# Patient Record
Sex: FEMALE | Race: WHITE
[De-identification: names, ages, dates, MRNs, and addresses within clinical notes are randomized per-mention and may not be internally consistent; named-entity substitution may affect disease eponyms.]

---

## 2021-10-04 ENCOUNTER — HOSPITAL ENCOUNTER (EMERGENCY)
Dept: HOSPITAL 63 - ER | Age: 66
Discharge: HOME | End: 2021-10-04
Payer: MEDICARE

## 2021-10-04 VITALS — DIASTOLIC BLOOD PRESSURE: 74 MMHG | SYSTOLIC BLOOD PRESSURE: 127 MMHG

## 2021-10-04 VITALS — WEIGHT: 293 LBS | BODY MASS INDEX: 45.99 KG/M2 | HEIGHT: 67 IN

## 2021-10-04 DIAGNOSIS — Z91.011: ICD-10-CM
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
## 2021-10-04 DIAGNOSIS — S01.112A: Primary | ICD-10-CM

## 2021-10-04 DIAGNOSIS — Z88.8: ICD-10-CM

## 2021-10-04 DIAGNOSIS — S40.021A: ICD-10-CM

## 2021-10-04 LAB
ANION GAP SERPL CALC-SCNC: 0 MMOL/L (ref 6–14)
APTT PPP: YELLOW S
BACTERIA #/AREA URNS HPF: 0 /HPF
BASOPHILS # BLD AUTO: 0.1 X10^3/UL (ref 0–0.2)
BASOPHILS NFR BLD: 1 % (ref 0–3)
BILIRUB UR QL STRIP: (no result)
CA-I SERPL ISE-MCNC: 18 MG/DL (ref 7–20)
CALCIUM SERPL-MCNC: 8.9 MG/DL (ref 8.5–10.1)
CHLORIDE SERPL-SCNC: 101 MMOL/L (ref 98–107)
CO2 SERPL-SCNC: 44 MMOL/L (ref 21–32)
CREAT SERPL-MCNC: 0.8 MG/DL (ref 0.6–1)
EOSINOPHIL NFR BLD: 0.4 X10^3/UL (ref 0–0.7)
EOSINOPHIL NFR BLD: 5 % (ref 0–3)
ERYTHROCYTE [DISTWIDTH] IN BLOOD BY AUTOMATED COUNT: 13.3 % (ref 11.5–14.5)
FIBRINOGEN PPP-MCNC: CLEAR MG/DL
GFR SERPLBLD BASED ON 1.73 SQ M-ARVRAT: 71.5 ML/MIN
GLUCOSE SERPL-MCNC: 108 MG/DL (ref 70–99)
GLUCOSE UR STRIP-MCNC: (no result) MG/DL
HCT VFR BLD CALC: 34.4 % (ref 36–47)
HGB BLD-MCNC: 10.8 G/DL (ref 12–15.5)
LYMPHOCYTES # BLD: 1.5 X10^3/UL (ref 1–4.8)
LYMPHOCYTES NFR BLD AUTO: 19 % (ref 24–48)
MCH RBC QN AUTO: 30 PG (ref 25–35)
MCHC RBC AUTO-ENTMCNC: 31 G/DL (ref 31–37)
MCV RBC AUTO: 95 FL (ref 79–100)
MONO #: 0.9 X10^3/UL (ref 0–1.1)
MONOCYTES NFR BLD: 12 % (ref 0–9)
NEUT #: 4.9 X10^3UL (ref 1.8–7.7)
NEUTROPHILS NFR BLD AUTO: 63 % (ref 31–73)
NITRITE UR QL STRIP: (no result)
PLATELET # BLD AUTO: 244 X10^3/UL (ref 140–400)
POTASSIUM SERPL-SCNC: 3.6 MMOL/L (ref 3.5–5.1)
RBC # BLD AUTO: 3.64 X10^6/UL (ref 3.5–5.4)
RBC #/AREA URNS HPF: 0 /HPF (ref 0–2)
SODIUM SERPL-SCNC: 145 MMOL/L (ref 136–145)
SP GR UR STRIP: 1.02
SQUAMOUS #/AREA URNS LPF: (no result) /LPF
UROBILINOGEN UR-MCNC: 0.2 MG/DL
WBC # BLD AUTO: 7.9 X10^3/UL (ref 4–11)
WBC #/AREA URNS HPF: 0 /HPF (ref 0–4)

## 2021-10-04 PROCEDURE — 81001 URINALYSIS AUTO W/SCOPE: CPT

## 2021-10-04 PROCEDURE — 12013 RPR F/E/E/N/L/M 2.6-5.0 CM: CPT

## 2021-10-04 PROCEDURE — 80048 BASIC METABOLIC PNL TOTAL CA: CPT

## 2021-10-04 PROCEDURE — 93005 ELECTROCARDIOGRAM TRACING: CPT

## 2021-10-04 PROCEDURE — 70450 CT HEAD/BRAIN W/O DYE: CPT

## 2021-10-04 PROCEDURE — 85025 COMPLETE CBC W/AUTO DIFF WBC: CPT

## 2021-10-04 PROCEDURE — 36415 COLL VENOUS BLD VENIPUNCTURE: CPT

## 2021-10-04 NOTE — RAD
CT head without contrast:



Reason for examination: Fell today with dizziness.



Helical images were obtained through the brain with no contrast administered.



Exposure: One or more of the following individualized dose reduction techniques were utilized for thi
s examination:  1. Automated exposure control  2. Adjustment of the mA and/or kV according to patient
 size  3. Use of iterative reconstruction technique.



Ventricular systems are symmetric and not dilated. No midline shift is seen. There is no evidence of 
intracranial hemorrhage, infarct, mass or edema. These are seen at the orbits. The paranasal sinuses 
and mastoid air cells are clear. No acute abnormality seen in the skull.



IMPRESSION:



No acute intracranial abnormality.



Electronically signed by: Tracy Manning MD (10/4/2021 3:27 PM) [facility]

## 2021-10-04 NOTE — PHYS DOC
Past History


Past Surgical History:  Other


 (KADE MAGALLANES)





General Adult


EDM:


Chief Complaint:  MECHANICAL FALL





HPI:


HPI:





Patient is a 66 year old female who presents from nursing home via EMS with head

laceration and right arm contusion.  Patient states that she was going to sit in

her wheelchair, when she is got dizzy suddenly.  She states she missed the chair

and extended her arm to catch her fall.  She reports she did hit her head on the

linoleum floor, where she sustained her laceration.  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 

Dawson," and has fallen about 3 times a month for the past 6 months.  Patient 

denies headache, loss of consciousness, blurred vision, double vision, or loss 

of vision.  Patient reports she is incontinent.  Patient has no other complaints

at this time.


 (KADE MAGALLANES)





Review of Systems:


Review of Systems:


Constitutional:  Denies fever or chills 


Eyes:  See HPI 


HENT:  Denies nasal congestion or sore throat 


Respiratory:  Denies cough or shortness of breath 


Cardiovascular:  Denies chest pain or edema 


GI:  Denies abdominal pain, nausea, vomiting, bloody stools or diarrhea 


: Denies dysuria or hematuria 


Musculoskeletal:  Denies back pain or joint pain 


Integument:  See HPI


Neurologic:  Denies headache, focal weakness or sensory changes 


 (KADE MAGALLANES)





Allergies:


Allergies:





Allergies








Coded Allergies Type Severity Reaction Last Updated Verified


 


  Milk Containing Products Allergy Unknown  10/4/21 Yes


 


  meperidine Allergy Unknown  10/4/21 Yes








 (KADE MAGALLANES)





Physical Exam:


PE:





Constitutional: Patient resting comfortably in bed with oxygen via nasal 

cannula.  Well developed, well nourished, no acute distress, non-toxic 

appearance. 


HENT: Normocephalic, laceration as noted below, bilateral external ears normal, 

oropharynx moist, no oral exudates, missing dentition without infection, nose 

normal. 


Eyes: PERRLA, EOMI, conjunctiva normal, no discharge. 


Neck: Normal range of motion, no step-offs, no bony tenderness, supple, no st

ridor. 


Cardiovascular: Heart rate regular rhythm, no murmur.


Lungs & Thorax:  Bilateral breath sounds clear to auscultation.


Abdomen: Bowel sounds normal, soft, no tenderness, no masses, no pulsatile 

masses.


Skin: 4 cm laceration noted to left supraorbital ridge in the eyebrow.  

Otherwise skin is warm, dry, no erythema, no rash.  


Back: No step-offs, no bony tenderness, no CVA tenderness. 


Extremities: Minimal tenderness to a dorsal side distal right upper extremity 

contusion with hematoma.  Extremities otherwise no tenderness, no cyanosis, no 

clubbing, ROM intact, no edema.  Neurovascular intact x4.


Neurologic: Alert and oriented x3, normal motor function, normal sensory 

function, no focal deficits noted.


 (KADE MAGALLANES)





Current Patient Data:


Labs:





Laboratory Tests








Test


 10/4/21


15:46 10/4/21


17:12


 


White Blood Count


 7.9 x10^3/uL


(4.0-11.0) 





 


Red Blood Count


 3.64 x10^6/uL


(3.50-5.40) 





 


Hemoglobin


 10.8 g/dL


(12.0-15.5) 





 


Hematocrit


 34.4 %


(36.0-47.0) 





 


Mean Corpuscular Volume 95 fL ()  


 


Mean Corpuscular Hemoglobin 30 pg (25-35)  


 


Mean Corpuscular Hemoglobin


Concent 31 g/dL


(31-37) 





 


Red Cell Distribution Width


 13.3 %


(11.5-14.5) 





 


Platelet Count


 244 x10^3/uL


(140-400) 





 


Neutrophils (%) (Auto) 63 % (31-73)  


 


Lymphocytes (%) (Auto) 19 % (24-48)  


 


Monocytes (%) (Auto) 12 % (0-9)  


 


Eosinophils (%) (Auto) 5 % (0-3)  


 


Basophils (%) (Auto) 1 % (0-3)  


 


Neutrophils # (Auto)


 4.9 x10^3uL


(1.8-7.7) 





 


Lymphocytes # (Auto)


 1.5 x10^3/uL


(1.0-4.8) 





 


Monocytes # (Auto)


 0.9 x10^3/uL


(0.0-1.1) 





 


Eosinophils # (Auto)


 0.4 x10^3/uL


(0.0-0.7) 





 


Basophils # (Auto)


 0.1 x10^3/uL


(0.0-0.2) 





 


Sodium Level


 145 mmol/L


(136-145) 





 


Potassium Level


 3.6 mmol/L


(3.5-5.1) 





 


Chloride Level


 101 mmol/L


() 





 


Carbon Dioxide Level


 44 mmol/L


(21-32) 





 


Anion Gap 0 (6-14)  


 


Blood Urea Nitrogen


 18 mg/dL


(7-20) 





 


Creatinine


 0.8 mg/dL


(0.6-1.0) 





 


Estimated GFR


(Cockcroft-Gault) 71.5 


 





 


Glucose Level


 108 mg/dL


(70-99) 





 


Calcium Level


 8.9 mg/dL


(8.5-10.1) 





 


Urine Collection Type  Unknown 


 


Urine Color  Yellow 


 


Urine Clarity  Clear 


 


Urine pH  7.5 


 


Urine Specific Gravity  1.025 


 


Urine Protein


 


 Neg


(NEG-TRACE)


 


Urine Glucose (UA)


 


 Neg mg/dL


(NEG)


 


Urine Ketones (Stick)


 


 Neg mg/dL


(NEG)


 


Urine Blood  Neg (NEG) 


 


Urine Nitrite  Neg (NEG) 


 


Urine Bilirubin  Neg (NEG) 


 


Urine Urobilinogen Dipstick


 


 0.2 mg/dL (0.2


mg/dL)


 


Urine Leukocyte Esterase  Neg (NEG) 


 


Urine RBC  0 /HPF (0-2) 


 


Urine WBC  0 /HPF (0-4) 


 


Urine Squamous Epithelial


Cells 


 None /LPF 





 


Urine Bacteria  0 /HPF (0-FEW) 








Vital Signs:





                                   Vital Signs








  Date Time  Temp Pulse Resp B/P (MAP) Pulse Ox O2 Delivery O2 Flow Rate FiO2


 


10/4/21 15:00 97.2 79 16 126/52 (76) 97  4.0 








 (KADE MAGALLANES)





EKG:


EKG:


EKG Interpreted by Dr. Condon: Regular rate 77bpm and rhythm with no ectopic 

beats.  No concerning ST-T wave changes.  Regular QR interval. 


 (KADE MAGALLANES)





Radiology/Procedures:


Radiology/Procedures:


PROCEDURE: CT HEAD WO CONTRAST





CT head without contrast:





Reason for examination: Fell today with dizziness.





Helical images were obtained through the brain with no contrast administered.





Exposure: One or more of the following individualized dose reduction techniques 

were utilized for this examination:  1. Automated exposure control  2. 

Adjustment of the mA and/or kV according to patient size  3. Use of iterative 

reconstruction technique.





Ventricular systems are symmetric and not dilated. No midline shift is seen. 

There is no evidence of intracranial hemorrhage, infarct, mass or edema. These 

are seen at the orbits. The paranasal sinuses and mastoid air cells are clear. 

No acute abnormality seen in the skull.





IMPRESSION:





No acute intracranial abnormality.





Electronically signed by: Tracy Manning MD (10/4/2021 3:27 PM) [facility]


 (KADE MAGALLANES)





Heart Score:


C/O Chest Pain:  No


 (KADE MAGALLANES)





Course & Med Decision Making:


Course & Med Decision Making


Pertinent Labs and Imaging studies reviewed. (See chart for details)





EMS originally reported patient's fall is mechanical fall, however on interview 

patient states that she did feel suddenly lightheaded before she fell.  Work-up 

today will include EKG to exclude heart blocks as well as head CT, urinalysis 

and blood work.





Blood work, CT and EKG are reassuring. I will wait for UA to ensure patient does

not have underlying UTI.


 (KADE MAGALLANES)





Dragon Disclaimer:


Dragon Disclaimer:


This electronic medical record was generated, in whole or in part, using a voice

recognition dictation system.


 (KADE MAGALLANES)





Laceration Repair


Lac Repair


Indication: Laceration to right supraorbital ridge





Procedure: The patient was placed in the appropriate position and anesthesia 

around the 4 mL 2% lidocaine with epinephrine. The area was then cleansed with 

Betadine solution. The laceration was closed with seven 6-0 nylon sutures.  The 

wound area was then dressed with Steri-Strip.  





Total repaired wound length: 4 cm. 





Other Items: The patient tolerated the procedure very well.





Complications: There were no complications.


 (KADE MAGALLANES)


Attending Co-Sign


The patient was seen and interviewed as well as examined at the bedside. The 

chart was reviewed. The case was discussed. Agree with the plan of care.


 (JENNY CONDON DO)





Departure


Departure:


Impression:  


   Primary Impression:  


   Laceration of eyebrow, left


   Qualified Codes:  S01.112A - Laceration without foreign body of left eyelid 

   and periocular area, initial encounter


   Additional Impression:  


   Contusion of right arm


   Qualified Codes:  S40.021A - Contusion of right upper arm, initial encounter


Disposition:  01 HOME / SELF CARE / HOMELESS


Condition:  STABLE


Referrals:  


PCP,NO (PCP)


Patient Instructions:  Contusion, Easy-to-Read, Facial Laceration, Easy-to-Read





Additional Instructions:  


EMERGENCY DEPARTMENT GENERAL DISCHARGE INSTRUCTIONS





Thank you for coming to [facility] (ED) today and trusting us

with you 


care.  We trust that you had a positive experience in our Emergency Department. 

If you 


wish to speak to the department management, you may call the director at 

[phone].





YOUR FOLLOW UP INSTRUCTIONS ARE AS FOLLOWS:





1.  Do you have a private Doctor?  If you do not have a private doctor, please 

ask for a 


resource list of physicians or clinics that may be able to assist you with 

follow up care.





2.  The Emergency Physician has interpreted your imaging studies.  The radiology

specialist will also 


review them.  If there is a change in the findings, you will be notified in 48 

hours when at 


all possible.





3.  A lab test or culture has been done, your results will be reviewed and you 

will be 


notified if you need a change in treatment.





ADDITIONAL INSTRUCTIONS AND INFORMATION:





1.  Your care today has been supervised by a physician who is specially trained 

in emergency 


care.  Many problems require more than one evaluation for a complete diagnosis 

and 


treatment.  We recommend that you schedule your follow up appointment as 

recommended to 


ensure complete treatment of you illness or injury.  If you are unable to obtain

follow up 


care and continue to have a problem, or if your condition worsens, we recommend 

that you 


return to the ED.





2.  We are not able to safely determine your condition over the phone nor are we

able to 


give sound medical advice over the phone.  For these safety reasons, if you call

for medical 


advice we will ask you to come to the ED for further evaluation.





3.  If you have any questions regarding these discharge instructions please call

the ED at 


[phone].





SAFETY INFORMATION:





In the interest of safety, wellness, and injury prevention; we encourage you to 

wear your 


sealbelt, if you smoke; quite smoking, and we encourage family to use a 

protective helmet 


for bicycling and other sporting events that present an increased risk for head 

injury.





IF YOUR SYMPTOMS WORSEN OR NEW SYMPTOMS DEVELOP, OR YOU HAVE CONCERNS ABOUT YOUR

CONDITION; 


OR IF YOUR CONDITION WORSENS WHILE YOU ARE WAITING FOR YOUR FOLLOW UP 

APPOINTMENT; EITHER 


CONTACT YOUR PRIMARY CARE DOCTOR, THE PHYSICIAN WHOSE NAME AND NUMBER YOU WERE 

GIVEN, OR 


RETURN TO THE ED IMMEDIATELY.











KADE MAGALLANES               Oct 4, 2021 15:35


JENNY CONDON DO                  Oct 6, 2021 06:15

## 2021-10-04 NOTE — EKG
[facility] [address], [place] [address]

Test Date:    2021-10-04               Test Time:    14:56:58

Pat Name:     [facility]              Department:   

Patient ID:   [id number]           Room:          

Gender:       F                        Technician:   SHUN

:          [id number]               Requested By: KADE MAGALLANES

Order Number: [id number]            Reading MD:   Julian Russell

                                 Measurements

Intervals                              Axis          

Rate:         77                       P:            39

WI:           184                      QRS:          28

QRSD:         92                       T:            40

QT:           388                                    

QTc:          441                                    

                           Interpretive Statements

SINUS RHYTHM

Electronically Signed On 10-5-2021 13:38:48 CDT by Julian Russell